# Patient Record
(demographics unavailable — no encounter records)

---

## 2025-07-28 NOTE — HISTORY OF PRESENT ILLNESS
[FreeTextEntry1] : Est Care. Pt complains of heartburn from time to time. Pt feels pain near her lower back.  [de-identified] : Ms. GUILLERMO MARQUEZ is a 59 year old female here today to establish care.  Struggling with weight loss- trying to eat less and exercising. Feels better and sleeping better.  c/o occasional heartburn  c/o lower back pain.  Eye: Overdue Hx of breast cancer- wants to be tested for BRCA TdaP: ?

## 2025-07-28 NOTE — HEALTH RISK ASSESSMENT
[No] : No [No falls in past year] : Patient reported no falls in the past year [0] : 2) Feeling down, depressed, or hopeless: Not at all (0) [PHQ-2 Negative - No further assessment needed] : PHQ-2 Negative - No further assessment needed [Patient reported PAP Smear was normal] : Patient reported PAP Smear was normal [Patient reported colonoscopy was normal] : Patient reported colonoscopy was normal [With Significant Other] : lives with significant other [Employed] : employed [Feels Safe at Home] : Feels safe at home [Fully functional (bathing, dressing, toileting, transferring, walking, feeding)] : Fully functional (bathing, dressing, toileting, transferring, walking, feeding) [Fully functional (using the telephone, shopping, preparing meals, housekeeping, doing laundry, using] : Fully functional and needs no help or supervision to perform IADLs (using the telephone, shopping, preparing meals, housekeeping, doing laundry, using transportation, managing medications and managing finances) [Reports normal functional visual acuity (ie: able to read med bottle)] : Reports normal functional visual acuity [Never] : Never [Patient declined mammogram] : Patient declined mammogram [Audit-CScore] : 0 [[id number]] : 0 [Reports changes in hearing] : Reports no changes in hearing [Reports changes in vision] : Reports no changes in vision [Reports changes in dental health] : Reports no changes in dental health [MammogramComments] : Double Mastectomy [PapSmearDate] : 01/23 [ColonoscopyDate] : 04/23 [FreeTextEntry2] :

## 2025-07-28 NOTE — REVIEW OF SYSTEMS
[Back Pain] : back pain [Negative] : Psychiatric [Recent Change In Weight] : ~T no recent weight change [Vision Problems] : no vision problems [Constipation] : no constipation [Heartburn] : no heartburn [FreeTextEntry6] : cough x 3 weeks